# Patient Record
Sex: FEMALE | Race: WHITE | Employment: UNEMPLOYED | URBAN - NONMETROPOLITAN AREA
[De-identification: names, ages, dates, MRNs, and addresses within clinical notes are randomized per-mention and may not be internally consistent; named-entity substitution may affect disease eponyms.]

---

## 2019-01-09 ENCOUNTER — OFFICE VISIT (OUTPATIENT)
Dept: FAMILY MEDICINE | Facility: OTHER | Age: 19
End: 2019-01-09
Attending: PHYSICIAN ASSISTANT
Payer: COMMERCIAL

## 2019-01-09 VITALS
WEIGHT: 151.5 LBS | SYSTOLIC BLOOD PRESSURE: 100 MMHG | HEART RATE: 73 BPM | DIASTOLIC BLOOD PRESSURE: 68 MMHG | OXYGEN SATURATION: 99 % | TEMPERATURE: 98.9 F

## 2019-01-09 DIAGNOSIS — R50.9 FEVER OF UNDETERMINED ORIGIN: ICD-10-CM

## 2019-01-09 DIAGNOSIS — J01.00 ACUTE NON-RECURRENT MAXILLARY SINUSITIS: Primary | ICD-10-CM

## 2019-01-09 DIAGNOSIS — H60.391 INFECTIVE OTITIS EXTERNA, RIGHT: ICD-10-CM

## 2019-01-09 LAB
FLUAV+FLUBV RNA SPEC QL NAA+PROBE: NEGATIVE
FLUAV+FLUBV RNA SPEC QL NAA+PROBE: NEGATIVE
RSV RNA SPEC NAA+PROBE: NEGATIVE
SPECIMEN SOURCE: NORMAL

## 2019-01-09 PROCEDURE — 87631 RESP VIRUS 3-5 TARGETS: CPT | Performed by: PHYSICIAN ASSISTANT

## 2019-01-09 PROCEDURE — 99214 OFFICE O/P EST MOD 30 MIN: CPT | Performed by: PHYSICIAN ASSISTANT

## 2019-01-09 RX ORDER — GABAPENTIN 400 MG/1
400 CAPSULE ORAL
COMMUNITY

## 2019-01-09 RX ORDER — CIPROFLOXACIN AND DEXAMETHASONE 3; 1 MG/ML; MG/ML
4 SUSPENSION/ DROPS AURICULAR (OTIC) 2 TIMES DAILY
Qty: 2.8 ML | Refills: 0 | Status: SHIPPED | OUTPATIENT
Start: 2019-01-09 | End: 2019-01-16

## 2019-01-09 RX ORDER — PREDNISONE 1 MG/1
1 TABLET ORAL DAILY
COMMUNITY

## 2019-01-09 RX ORDER — AMOXICILLIN 875 MG
875 TABLET ORAL 2 TIMES DAILY
Qty: 20 TABLET | Refills: 0 | Status: SHIPPED | OUTPATIENT
Start: 2019-01-09 | End: 2019-01-19

## 2019-01-09 RX ORDER — HYDROXYCHLOROQUINE SULFATE 200 MG/1
200 TABLET, FILM COATED ORAL DAILY
COMMUNITY

## 2019-01-09 RX ORDER — AZATHIOPRINE 50 MG/1
50 TABLET ORAL 3 TIMES DAILY
COMMUNITY

## 2019-01-09 RX ORDER — LEVOTHYROXINE SODIUM 25 UG/1
25 TABLET ORAL DAILY
COMMUNITY

## 2019-01-09 SDOH — HEALTH STABILITY: MENTAL HEALTH: HOW OFTEN DO YOU HAVE A DRINK CONTAINING ALCOHOL?: NEVER

## 2019-01-09 NOTE — PROGRESS NOTES
SUBJECTIVE:  Gabriela Sharif is a 18 year old female presents to [facility] for evaluation of right ear pain, congestion, sinus congestion, jaw pain, sneezing  Onset 3 days ago, Course is worsening  Associated symptoms: bilateral eye redness and tearing    OM history: infrequent infections. She does get sinus infections. Last one 3 months ago.   She has seasonal allergies. Sinus infections usually coincide with season change.   Treatments: ibuprofen 2 tablets, last dose 1 AM  Eating less, drinking OK  She as lupus and has been on prednisone the past 4 days. She stopped the prednisone 2 days ago because she thought it might reduce her immune system.     History reviewed. No pertinent past medical history.  Current Outpatient Medications   Medication     azaTHIOprine (IMURAN) 50 MG tablet     gabapentin (NEURONTIN) 400 MG capsule     hydroxychloroquine (PLAQUENIL) 200 MG tablet     levothyroxine (SYNTHROID/LEVOTHROID) 25 MCG tablet     predniSONE (DELTASONE) 1 MG tablet     No current facility-administered medications for this visit.       No Known Allergies    ROS  General: fatigue, fever. Max 102.1 last night  HENT: POSITIVE see HPI  Respiratory: mild dry cough  Abdomen: negative  Skin: butterfly rash (lupus)    OBJECTIVE:  Vitals:    01/09/19 1236   BP: 100/68   Pulse: 73   Temp: 98.9  F (37.2  C)   TempSrc: Tympanic   SpO2: 99%   Weight: 68.7 kg (151 lb 8 oz)     General appearance: healthy, alert, mild distress   Eyes: bilateral injection, tearing, drainage on right eye  Ears: abnormal: TM's mild effusion bilaterally, no erythema R canal is erythematous and TTP  Nose: mucosal erythema and mucosal edema  Oropharynx: mild erythema  Neck: normal, supple and no adenopathy  Lungs: normal respiration, clear to ausculation    Results for orders placed or performed in visit on 01/09/19   Influenza A and B and RSV PCR   Result Value Ref Range    Specimen Description Nasopharyngeal     Influenza A PCR Negative  NEG^Negative    Influenza B PCR Negative NEG^Negative    Resp Syncytial Virus Negative NEG^Negative         ASSESSMENT:  (J01.00) Acute non-recurrent maxillary sinusitis  (primary encounter diagnosis)  Plan: amoxicillin (AMOXIL) 875 MG tablet      (R50.9) Fever of undetermined origin  Plan: Influenza A and B and RSV PCR      (H60.391) Infective otitis externa, right  Plan: ciprofloxacin-dexamethasone (CIPRODEX) 0.3-0.1         % otic suspension    Influenza is negative, patient notified by phone  Ciprodex 4 drops bid to right ear canal  Will start on Amoxicillin 875 mg oral tablet, take twice daily x 10 days for Sinusitis  Symptomatic treatments per AVS  Follow up with PCP if symptoms persist or worsen  Patient received verbal and written instruction including review of warning signs    Adelina Blanco PA-C on 1/9/2019 at 8:32 PM

## 2019-01-09 NOTE — PATIENT INSTRUCTIONS
Fever and sinus symptoms, ear pain on right  Ear canal is inflamed I will start you on some ear drops for this  Influenza is pending, I will call with results and recommendations    Patient Education     Acute Sinusitis    Acute sinusitis is irritation and swelling of the sinuses. It is usually caused by a viral infection after a common cold. Your doctor can help you find relief.  What is acute sinusitis?  Sinuses are air-filled spaces in the skull behind the face. They are kept moist and clean by a lining of mucosa. Things such as pollen, smoke, and chemical fumes can irritate the mucosa. It can then swell up. As a response to irritation, the mucosa makes more mucus and other fluids. Tiny hairlike cilia cover the mucosa. Cilia help carry mucus toward the opening of the sinus. Too much mucus may cause the cilia to stop working. This blocks the sinus opening. A buildup of fluid in the sinuses then causes pain and pressure. It can also encourage bacteria to grow in the sinuses.  Common symptoms of acute sinusitis  You may have:    Facial soreness pain    Headache    Fever    Fluid draining in the back of the throat (postnasal drip)    Congestion    Drainage that is thick and colored, instead of clear    Cough  Diagnosing acute sinusitis  Your doctor will ask about your symptoms and health history. He or she will look at your ear, nose, and throat. You usually won't need to have X-rays taken.    The doctor may take a sample of mucus to check for bacteria. If you have sinusitis that keeps coming back, you may need imaging tests such as X-rays or CAT scans. This will help your doctor check for a structural problem that may be causing the infection.  Treating acute sinusitis  Treatment is aimed at unblocking the sinus opening and helping the cilia work again. You may need to take antihistamine and decongestant medicine. These can reduce inflammation and decrease the amount of fluid your sinuses make. If you have a  bacterial infection, you will need to take antibiotic medicine for 10 to 14 days. Take this medicine until it is gone, even if you feel better.  Date Last Reviewed: 10/1/2016    [id number] [employer] [address] [place] [address], [place] [address]. All rights reserved. This information is not intended as a substitute for professional medical care. Always follow your healthcare professional's instructions.

## 2019-01-09 NOTE — NURSING NOTE
Chief Complaint   Patient presents with     Ear Problem     right     Medication Reconciliation: complete     Patient here today for a possible right ear infection. Patient is also having sinus issues: facial pain and pressure, eyes are watering, runny nose, sneezing, with the right ear pain. Sx x 3 days.    Rubina Ho, CMA